# Patient Record
Sex: MALE | Race: WHITE | NOT HISPANIC OR LATINO | Employment: FULL TIME | URBAN - METROPOLITAN AREA
[De-identification: names, ages, dates, MRNs, and addresses within clinical notes are randomized per-mention and may not be internally consistent; named-entity substitution may affect disease eponyms.]

---

## 2019-05-02 ENCOUNTER — OFFICE VISIT (OUTPATIENT)
Dept: FAMILY MEDICINE CLINIC | Facility: CLINIC | Age: 24
End: 2019-05-02

## 2019-05-02 VITALS
TEMPERATURE: 98.4 F | OXYGEN SATURATION: 99 % | DIASTOLIC BLOOD PRESSURE: 60 MMHG | RESPIRATION RATE: 16 BRPM | HEART RATE: 84 BPM | SYSTOLIC BLOOD PRESSURE: 100 MMHG | HEIGHT: 74 IN | BODY MASS INDEX: 22.33 KG/M2 | WEIGHT: 174 LBS

## 2019-05-02 DIAGNOSIS — L72.3 INFECTED SEBACEOUS CYST OF SKIN: Primary | ICD-10-CM

## 2019-05-02 DIAGNOSIS — L08.9 INFECTED SEBACEOUS CYST OF SKIN: Primary | ICD-10-CM

## 2019-05-02 PROCEDURE — 99213 OFFICE O/P EST LOW 20 MIN: CPT | Performed by: NURSE PRACTITIONER

## 2019-05-02 RX ORDER — CEPHALEXIN 500 MG/1
500 CAPSULE ORAL 3 TIMES DAILY
Qty: 30 CAPSULE | Refills: 0 | Status: SHIPPED | OUTPATIENT
Start: 2019-05-02 | End: 2019-10-10

## 2019-05-02 NOTE — PROGRESS NOTES
Subjective   Adolfo Vásquez is a 24 y.o. male.     History of Present Illness   Patient presents to office for painful cyst to right axilla.  Reports cyst has been present for about a year but was about the size of a pea. He states it recently started to increase in size and become painful. Denies fever, chills or drainage.    The following portions of the patient's history were reviewed and updated as appropriate: allergies, current medications, past family history, past medical history, past social history, past surgical history and problem list.    Review of Systems   Constitutional: Negative for chills and fever.   Musculoskeletal: Negative for joint swelling.   Skin:        cyst       Objective   Physical Exam   Constitutional: He is oriented to person, place, and time. He appears well-developed and well-nourished.   Pulmonary/Chest: Effort normal.   Neurological: He is alert and oriented to person, place, and time.   Skin: Skin is warm and dry.   1.5 cm firm, non fluctuant, tender cyst to right axilla    Psychiatric: He has a normal mood and affect. His behavior is normal. Judgment and thought content normal.   Nursing note and vitals reviewed.      Assessment/Plan   Adolfo was seen today for mass and painful to touch.    Diagnoses and all orders for this visit:    Infected sebaceous cyst of skin  -     cephalexin (KEFLEX) 500 MG capsule; Take 1 capsule by mouth 3 (Three) Times a Day.          Reports reaction to amoxicillin was red discoloration to the skin. Denies anaphylaxis, swelling, hives or itching.

## 2019-05-09 ENCOUNTER — OFFICE VISIT (OUTPATIENT)
Dept: FAMILY MEDICINE CLINIC | Facility: CLINIC | Age: 24
End: 2019-05-09

## 2019-05-09 VITALS
HEIGHT: 74 IN | WEIGHT: 174 LBS | HEART RATE: 71 BPM | OXYGEN SATURATION: 99 % | BODY MASS INDEX: 22.33 KG/M2 | DIASTOLIC BLOOD PRESSURE: 73 MMHG | RESPIRATION RATE: 16 BRPM | SYSTOLIC BLOOD PRESSURE: 120 MMHG

## 2019-05-09 DIAGNOSIS — L72.3 INFECTED SEBACEOUS CYST OF SKIN: Primary | ICD-10-CM

## 2019-05-09 DIAGNOSIS — L08.9 INFECTED SEBACEOUS CYST OF SKIN: Primary | ICD-10-CM

## 2019-05-09 PROCEDURE — 99212 OFFICE O/P EST SF 10 MIN: CPT | Performed by: NURSE PRACTITIONER

## 2019-05-09 NOTE — PROGRESS NOTES
Subjective   Adolfo Vásquez is a 24 y.o. male.     History of Present Illness   Patient presents to office for f/u on right axillary cyst. At last visit, he was started on cephalexin. States he has been taking as prescribed and has 3 days left.  He reports cyst has decreased in size, firmness and tenderness.  Has not drained.     The following portions of the patient's history were reviewed and updated as appropriate: allergies, current medications, past family history, past medical history, past social history, past surgical history and problem list.    Review of Systems   Constitutional: Negative for chills and fever.   Skin:        Cyst         Objective   Physical Exam   Constitutional: He is oriented to person, place, and time. He appears well-developed and well-nourished.   Pulmonary/Chest: Effort normal.   Neurological: He is oriented to person, place, and time.   Skin: Skin is warm and dry.        1 cm mildly fluctuant cyst to right axilla    Psychiatric: He has a normal mood and affect. His behavior is normal. Judgment and thought content normal.   Nursing note and vitals reviewed.      Assessment/Plan   Adolfo was seen today for follow-up and cyst.    Diagnoses and all orders for this visit:    Infected sebaceous cyst of skin             Patient to contact office on Monday to notify of improvement.  May need to extend antibiotics for a few more days.

## 2019-10-10 ENCOUNTER — OFFICE VISIT (OUTPATIENT)
Dept: FAMILY MEDICINE CLINIC | Facility: CLINIC | Age: 24
End: 2019-10-10

## 2019-10-10 VITALS
BODY MASS INDEX: 23.61 KG/M2 | DIASTOLIC BLOOD PRESSURE: 81 MMHG | RESPIRATION RATE: 16 BRPM | HEART RATE: 73 BPM | WEIGHT: 184 LBS | SYSTOLIC BLOOD PRESSURE: 138 MMHG | HEIGHT: 74 IN | OXYGEN SATURATION: 100 %

## 2019-10-10 DIAGNOSIS — Z87.19 HISTORY OF RECTAL BLEEDING: ICD-10-CM

## 2019-10-10 DIAGNOSIS — S29.019A THORACIC MYOFASCIAL STRAIN, INITIAL ENCOUNTER: Primary | ICD-10-CM

## 2019-10-10 PROBLEM — J30.2 CHRONIC SEASONAL ALLERGIC RHINITIS: Status: ACTIVE | Noted: 2019-10-10

## 2019-10-10 PROCEDURE — 99214 OFFICE O/P EST MOD 30 MIN: CPT | Performed by: FAMILY MEDICINE

## 2019-10-10 RX ORDER — PSYLLIUM SEED (WITH DEXTROSE)
2 POWDER (GRAM) ORAL DAILY
Qty: 60 WAFER | Refills: 0
Start: 2019-10-10 | End: 2021-07-16

## 2019-10-10 RX ORDER — IBUPROFEN 200 MG
200 TABLET ORAL EVERY 6 HOURS PRN
COMMUNITY
End: 2022-05-10

## 2019-10-10 NOTE — PROGRESS NOTES
"Chief Complaint:   Subjective    Rooming Tab(CC,VS,Pt Hx,Fall Screen)   Chief Complaint   Patient presents with   • Back Pain   • Rectal Bleeding     1 mo    • Hemorrhoids         History of Present Illness   Adolfo Vásquez is a 24 y.o. male who presents to the office today to evaluate 1 month history of middle back pain.  Patient has been lifting weights over the past 1 to 2 months.  He is not had an acute injury but has noticed the pain.  Pain seems to be helped with some stretching.  He did get a little relief when he took an ibuprofen.  Pain does not radiate.  Pain intensity is moderate.  Pain does interfere with sleep.  Denies urinary issues.  Patient has also recently over the past month or so had some painful bowel movements and some bright red blood noticed.  His bowel habits are about every 3 days.  He has noticed his bowel movements to have a hard consistency.    I have reviewed and updated his medications, medical history and problem list during today's office visit.     Problem List Tab  Medications Tab  Synopsis Tab  Chart Review Tab  Care Everywhere Tab  Immunizations Tab  Patient History Tab  Social History     Tobacco Use   • Smoking status: Current Every Day Smoker   • Smokeless tobacco: Never Used   Substance Use Topics   • Alcohol use: Yes     Comment: \"heavily\"       Review of Systems   Constitutional: Negative for chills, diaphoresis and fever.   Gastrointestinal:        See HPI   Genitourinary: Negative for dysuria and frequency.   Musculoskeletal: Positive for back pain.         Physical Examination:   Objective   Rooming Tab(CC,VS,Pt Hx,Fall Screen)  /81   Pulse 73   Resp 16   Ht 188 cm (74.02\")   Wt 83.5 kg (184 lb)   SpO2 100%   BMI 23.61 kg/m²     Body mass index is 23.61 kg/m².    Physical Exam   Constitutional: He appears well-developed. No distress.   Genitourinary:   Genitourinary Comments: No fissure seen, no external hemmorhoid   Musculoskeletal:        Thoracic back: " He exhibits tenderness. He exhibits normal range of motion.        Back:         Data Reviewed:                      Assessment/Plan:   Assessment/Plan   Order Review Tab  Health Maintenance Tab  Patient Plan/Order Tab  Diagnoses and all orders for this visit:    1. Thoracic myofascial strain, initial encounter (Primary)  Assessment & Plan:  New problem most likely due to recent weight lifting.  Will engage physical therapy to evaluate and treat.  Use ibuprofen as needed.    Orders:  -     Ambulatory Referral to Physical Therapy Evaluate and treat    2. History of rectal bleeding  Assessment & Plan:  New problem over the last 1/2 months.  If current regimen does not work then at our next visit we may consider referral to colorectal surgeon.    Orders:  -     hydrocortisone-pramoxine (PROCTOFOAM HC) 1-1 % rectal foam; Insert 1 applicator into the rectum 2 (Two) Times a Day for 7 days.  Dispense: 10 g; Refill: 0  -     Psyllium (METAMUCIL) wafer wafer; Take 2 wafers by mouth Daily for 30 days.  Dispense: 60 wafer; Refill: 0     Follow up:   Wrapup Tab  Return in about 1 month (around 11/10/2019) for Recheck.

## 2019-10-10 NOTE — ASSESSMENT & PLAN NOTE
New problem most likely due to recent weight lifting.  Will engage physical therapy to evaluate and treat.  Use ibuprofen as needed.

## 2019-10-10 NOTE — PATIENT INSTRUCTIONS
Increase fiber in the diet including fruits vegetables and whole grain cereals and breads.  Make sure you drink 6 to 8 glasses of water daily.  Take the Metamucil as directed.

## 2019-10-10 NOTE — ASSESSMENT & PLAN NOTE
New problem over the last 1/2 months.  If current regimen does not work then at our next visit we may consider referral to colorectal surgeon.

## 2020-01-30 ENCOUNTER — OFFICE VISIT (OUTPATIENT)
Dept: FAMILY MEDICINE CLINIC | Facility: CLINIC | Age: 25
End: 2020-01-30

## 2020-01-30 VITALS
HEIGHT: 74 IN | RESPIRATION RATE: 16 BRPM | HEART RATE: 51 BPM | WEIGHT: 193 LBS | SYSTOLIC BLOOD PRESSURE: 127 MMHG | DIASTOLIC BLOOD PRESSURE: 85 MMHG | BODY MASS INDEX: 24.77 KG/M2 | OXYGEN SATURATION: 98 %

## 2020-01-30 DIAGNOSIS — L03.213 PERIORBITAL CELLULITIS OF RIGHT EYE: Primary | ICD-10-CM

## 2020-01-30 PROCEDURE — 99214 OFFICE O/P EST MOD 30 MIN: CPT | Performed by: FAMILY MEDICINE

## 2020-01-30 RX ORDER — CLINDAMYCIN HYDROCHLORIDE 300 MG/1
300 CAPSULE ORAL 4 TIMES DAILY
Qty: 40 CAPSULE | Refills: 0 | Status: SHIPPED | OUTPATIENT
Start: 2020-01-30 | End: 2020-02-09

## 2020-01-30 NOTE — PROGRESS NOTES
"   Subjective       Chief Complaint   Patient presents with   • Eye Pain     right eye swollen/red         HPI:       Adolfo Vásquez is a 24 y.o. male who presents to the office today with infection in right eye.  Symptoms started Sunday and got worse on Monday.  No fever.  No sweats or chills.  He thinks it might be a foreign body but has not done anything other than get symptoms after he was laying on a couch.  The eye is painful when he moves his globe of the eye.  No issues of tenderness on the eyelid although it shows swelling and redness in that area.  Patient works as a  at a local [employer] and will be placed off work until released.    I have reviewed and updated his medications, medical history and problem list during today's office visit.     Social History     Tobacco Use   • Smoking status: Current Every Day Smoker   • Smokeless tobacco: Never Used   Substance Use Topics   • Alcohol use: Yes     Comment: \"heavily\"       Review of Systems   Constitutional: Negative for chills and fever.   Eyes:        See HPI         PE:   Objective   /85   Pulse 51   Resp 16   Ht 188 cm (74.02\")   Wt 87.5 kg (193 lb)   SpO2 98%   BMI 24.77 kg/m²     Body mass index is 24.77 kg/m².    Physical Exam   Constitutional: He appears well-developed. No distress.   Eyes:   Conjunctivitis noted right eye.  No foreign body seen.  Pupils equal round reactive.  Upper lower lids swollen red.  Nontender to palpation.        Data Reviewed:                      A/P:     Assessment/Plan   Diagnoses and all orders for this visit:    1. Periorbital cellulitis of right eye (Primary)  Assessment & Plan:  New problem.  Prescription sent to pharmacy.  Urgent referral to ophthalmology for evaluation.    Orders:  -     clindamycin (CLEOCIN) 300 MG capsule; Take 1 capsule by mouth 4 (Four) Times a Day for 10 days.  Dispense: 40 capsule; Refill: 0  -     Ambulatory Referral to Ophthalmology    Patient Instructions         "   Follow up:    Return if symptoms worsen or fail to improve.

## 2021-04-12 ENCOUNTER — OFFICE VISIT (OUTPATIENT)
Dept: FAMILY MEDICINE CLINIC | Facility: CLINIC | Age: 26
End: 2021-04-12

## 2021-04-12 VITALS
SYSTOLIC BLOOD PRESSURE: 130 MMHG | OXYGEN SATURATION: 96 % | TEMPERATURE: 97.1 F | RESPIRATION RATE: 16 BRPM | DIASTOLIC BLOOD PRESSURE: 82 MMHG | HEART RATE: 98 BPM | WEIGHT: 188 LBS | HEIGHT: 74 IN | BODY MASS INDEX: 24.13 KG/M2

## 2021-04-12 DIAGNOSIS — Z87.19 HISTORY OF RECTAL BLEEDING: Primary | ICD-10-CM

## 2021-04-12 PROBLEM — L03.213 PERIORBITAL CELLULITIS OF RIGHT EYE: Status: RESOLVED | Noted: 2020-01-30 | Resolved: 2021-04-12

## 2021-04-12 PROBLEM — S29.019A THORACIC MYOFASCIAL STRAIN: Status: RESOLVED | Noted: 2019-10-10 | Resolved: 2021-04-12

## 2021-04-12 PROCEDURE — 99213 OFFICE O/P EST LOW 20 MIN: CPT | Performed by: FAMILY MEDICINE

## 2021-04-12 NOTE — PROGRESS NOTES
"Chief Complaint  GI Problem    Subjective      History of Present Illness      Adolfo Váqsuez presents to [facility] to reevaluate history of rectal bleeding.  His symptoms continue to happen regularly.  He has bright red bleeding around the stool and not mixed with stool with most bowel movements.  Some pain with defecation.  No interval weight loss.  No other symptoms.    Review of Systems          Objective     Vital Signs:   /82   Pulse 98   Temp 97.1 °F (36.2 °C) (Tympanic)   Resp 16   Ht 188 cm (74\")   Wt 85.3 kg (188 lb)   SpO2 96%   BMI 24.14 kg/m²       Physical Exam  Constitutional:       Appearance: He is not ill-appearing.   Genitourinary:     Comments: No active bleeding.  Partially healed fissure noted  Neurological:      Mental Status: He is alert.              Result Review :                            Assessment/Plan     Assessment and Plan      Diagnoses and all orders for this visit:    1. History of rectal bleeding (Primary)  Assessment & Plan:  Ongoing problem.  Referral to colorectal surgeon for definitive diagnosis and treatment.  Will probably need colonoscopy.    Orders:  -     Ambulatory Referral to Colorectal Surgery          Follow Up   Return if symptoms worsen or fail to improve.    Patient was given instructions and counseling regarding his condition or for health maintenance advice. Please see specific information pulled into the AVS if appropriate.         "

## 2021-04-12 NOTE — ASSESSMENT & PLAN NOTE
Ongoing problem.  Referral to colorectal surgeon for definitive diagnosis and treatment.  Will probably need colonoscopy.

## 2021-07-16 ENCOUNTER — OFFICE VISIT (OUTPATIENT)
Dept: SURGERY | Facility: CLINIC | Age: 26
End: 2021-07-16

## 2021-07-16 VITALS
HEIGHT: 74 IN | TEMPERATURE: 97.1 F | HEART RATE: 71 BPM | BODY MASS INDEX: 24.45 KG/M2 | DIASTOLIC BLOOD PRESSURE: 76 MMHG | WEIGHT: 190.5 LBS | SYSTOLIC BLOOD PRESSURE: 120 MMHG | OXYGEN SATURATION: 97 %

## 2021-07-16 DIAGNOSIS — K60.2 ANAL FISSURE: Primary | ICD-10-CM

## 2021-07-16 PROCEDURE — 99243 OFF/OP CNSLTJ NEW/EST LOW 30: CPT | Performed by: COLON & RECTAL SURGERY

## 2021-07-16 NOTE — PROGRESS NOTES
Adolfo Vásquez is a 26 y.o. male who is seen as a consult at the request of Jt Jensen MD for Anal Fissure.      HPI:  Rectal Bleeding and pain with BM  RB occurs with almost every BM  BM every 2-3 days  Taking Metamucil with some improvement in symptoms  Tried OTC SS without significant improvement   Hx of Anal Fissure    No FHx of colon polyps, colon cancer, or IBD.     Past Medical History:   Diagnosis Date   • Chlamydia 03/13/2019   • Chronic seasonal allergic rhinitis 10/10/2019   • Dysuria 02/2019   • History of rectal bleeding 10/10/2019   • Infected sebaceous cyst 05/2019    RIGHT AXILLA   • Laceration of left index finger 01/02/2019    SEEN AT [facility]   • Periorbital cellulitis of right eye 1/30/2020   • Strep pharyngitis 03/2020   • Thoracic myofascial strain 10/2019       Past Surgical History:   Procedure Laterality Date   • WISDOM TOOTH EXTRACTION Bilateral        Social History:   reports that he has quit smoking. His smoking use included cigarettes. He has never used smokeless tobacco. He reports current alcohol use. He reports that he does not use drugs.      Marriage status: Single    History reviewed. No pertinent family history.      Current Outpatient Medications:   •  ibuprofen (ADVIL,MOTRIN) 200 MG tablet, Take 200 mg by mouth Every 6 (Six) Hours As Needed., Disp: , Rfl:     Allergy  Amoxicillin    Review of Systems   Constitutional: Negative for decreased appetite and weight gain.   HENT: Negative for congestion, hearing loss and hoarse voice.    Eyes: Negative for blurred vision, discharge and visual disturbance.   Cardiovascular: Negative for chest pain, cyanosis and leg swelling.   Respiratory: Negative for cough, shortness of breath, sleep disturbances due to breathing and snoring.    Endocrine: Negative for cold intolerance and heat intolerance.   Hematologic/Lymphatic: Does not bruise/bleed easily.   Skin: Positive for itching. Negative for poor wound healing and skin cancer.    Musculoskeletal: Positive for back pain. Negative for arthritis, joint pain and joint swelling.   Gastrointestinal: Positive for change in bowel habit. Negative for abdominal pain, bowel incontinence and constipation.   Genitourinary: Negative for bladder incontinence, dysuria and hematuria.   Neurological: Positive for excessive daytime sleepiness. Negative for brief paralysis, dizziness, focal weakness, headaches, light-headedness and weakness.   Psychiatric/Behavioral: Negative for altered mental status and hallucinations. The patient does not have insomnia.    Allergic/Immunologic: Negative for HIV exposure and persistent infections.   All other systems reviewed and are negative.      Vitals:    07/16/21 1427   BP: 120/76   Pulse: 71   Temp: 97.1 °F (36.2 °C)   SpO2: 97%     Body mass index is 24.46 kg/m².    Physical Exam  Exam conducted with a chaperone present.   Constitutional:       General: He is not in acute distress.     Appearance: He is well-developed.   HENT:      Head: Normocephalic and atraumatic.      Nose: Nose normal.   Eyes:      Conjunctiva/sclera: Conjunctivae normal.      Pupils: Pupils are equal, round, and reactive to light.   Neck:      Trachea: No tracheal deviation.   Pulmonary:      Effort: Pulmonary effort is normal. No respiratory distress.      Breath sounds: Normal breath sounds.   Abdominal:      General: Bowel sounds are normal. There is no distension.      Palpations: Abdomen is soft.   Genitourinary:     Comments: Perianal exam: Anterior anal fissure  Musculoskeletal:         General: No deformity. Normal range of motion.      Cervical back: Normal range of motion.   Skin:     General: Skin is warm and dry.   Neurological:      Mental Status: He is alert and oriented to person, place, and time.      Cranial Nerves: No cranial nerve deficit.      Coordination: Coordination normal.      Gait: Gait normal.   Psychiatric:         Behavior: Behavior normal.         Judgment:  Judgment normal.         Assessment:  1. Anal fissure      Plan:  - Rx for Diltiazem/Lidocaine compound cream provided. Pt instructed to apply the cream externally TID to relax the sphincter muscle  - Recommended 30-40g of Fiber daily, sitz baths, and SS  - Will consider fistulotomy if symptoms do not improve. Discussed risks, benefits, and alternatives.   - Follow up in 6-8 weeks for reevaluation       Scribed for José Manuel Neves MD by Indy Guillen PA-C. 7/16/2021  14:46 EDT   This patient was evaluated by me, recommendations made, documentation reviewed, edited, and revised by me, José Manuel Neves MD

## 2022-05-10 ENCOUNTER — OFFICE VISIT (OUTPATIENT)
Dept: FAMILY MEDICINE CLINIC | Facility: CLINIC | Age: 27
End: 2022-05-10

## 2022-05-10 VITALS
SYSTOLIC BLOOD PRESSURE: 122 MMHG | DIASTOLIC BLOOD PRESSURE: 70 MMHG | TEMPERATURE: 97.5 F | HEIGHT: 74 IN | WEIGHT: 190 LBS | BODY MASS INDEX: 24.38 KG/M2 | RESPIRATION RATE: 18 BRPM | HEART RATE: 70 BPM | OXYGEN SATURATION: 99 %

## 2022-05-10 DIAGNOSIS — R20.0 NUMBNESS AND TINGLING: Primary | ICD-10-CM

## 2022-05-10 DIAGNOSIS — R20.2 NUMBNESS AND TINGLING: Primary | ICD-10-CM

## 2022-05-10 DIAGNOSIS — R17 ELEVATED BILIRUBIN: ICD-10-CM

## 2022-05-10 DIAGNOSIS — L23.9 ALLERGIC CONTACT DERMATITIS, UNSPECIFIED TRIGGER: ICD-10-CM

## 2022-05-10 DIAGNOSIS — E55.9 VITAMIN D DEFICIENCY: ICD-10-CM

## 2022-05-10 PROCEDURE — 99214 OFFICE O/P EST MOD 30 MIN: CPT | Performed by: NURSE PRACTITIONER

## 2022-05-10 RX ORDER — NYSTATIN AND TRIAMCINOLONE ACETONIDE 100000; 1 [USP'U]/G; MG/G
1 OINTMENT TOPICAL 2 TIMES DAILY
Qty: 60 G | Refills: 1 | Status: SHIPPED | OUTPATIENT
Start: 2022-05-10 | End: 2022-08-15

## 2022-05-10 NOTE — PROGRESS NOTES
"Chief Complaint  numbness and tingling left foot and calf (3 days left foot and ankle )    Subjective          Adolfo presents to [facility]    27 year old male presented to the clinic today c/o some numbness and tingling in his left foot and ankle for the past 3 days.  He has not had an known injury that he is aware of, but does have some pain in the \"ball of his foot\"  He has a tender area, that is not red or swollen, there does look to be a puncture juan in that area, but he denies stepping on anything that he is aware of.  He did have some glass that was broken a few months ago that he stepped on in that area, but has not had any problems since that time.  He does have a history of low back pain, but no change or flare of that recently.  He has not had any recent lab work, but does not have a history of any anemia/ glucose problems in the past.  Numbness come with position changes,  Worse when he crosses his leg for extended time or sis/ sleeps in the same position for a long period of time.  No swelling in foot/ ankle/ calf  He does admit to lack of exercise, poor eating habits and occasional excessive alcohol intake    He also has a rash in his axillary area.  States it has been there intermittent for several months.  Improves any returns.  Unsure if it is related to deodorant he uses.  He has no changed that recently.  NO other new soaps or lotions.  States he does occassionally have rash on his torso, but none at this time.  Relieves on its own without any intervention.  No past allergy   testing.    B/p in office today 122/70. He denies any ha, no blurred vision, no dizziness, no acute chest pain or pressure.     He has no other c/o today      No recent labs-  He is fasting today except some peppers this am    The following portions of the patient's history were reviewed and updated as appropriate: allergies, current medications, past family history, past medical history, " "past social history, past surgical history, and problem list      Review of Systems   Constitutional: Negative for chills, fatigue and fever.   Eyes: Negative for visual disturbance.   Respiratory: Negative for cough and shortness of breath.    Cardiovascular: Negative for chest pain, palpitations and leg swelling.   Gastrointestinal: Negative for abdominal pain, diarrhea, nausea and vomiting.   Musculoskeletal: Positive for back pain.   Skin: Positive for rash.   Neurological: Positive for numbness. Negative for dizziness and light-headedness.        Objective   Vital Signs:   Vitals:    05/10/22 1436   BP: 122/70   Pulse: 70   Resp: 18   Temp: 97.5 °F (36.4 °C)   SpO2: 99%   Weight: 86.2 kg (190 lb)   Height: 188 cm (74.02\")        BMI is within normal parameters. No follow-up required.       Physical Exam  Vitals reviewed.   Constitutional:       Appearance: Normal appearance. He is not ill-appearing.   HENT:      Head: Normocephalic.      Nose: Nose normal.      Mouth/Throat:      Mouth: Mucous membranes are moist.      Pharynx: Oropharynx is clear.   Eyes:      Conjunctiva/sclera: Conjunctivae normal.   Neck:      Vascular: No carotid bruit.   Cardiovascular:      Rate and Rhythm: Normal rate and regular rhythm.      Pulses: Normal pulses.      Heart sounds: Normal heart sounds. No murmur heard.  Pulmonary:      Effort: Pulmonary effort is normal.      Breath sounds: Normal breath sounds.   Musculoskeletal:        Feet:    Feet:      Comments: Small puncture juan to left sole under heads of the metatarsal bones  Numbness and tingling intermittent to left foot and toes  No swelling, erythema/ drainage  Skin:     General: Skin is warm.      Findings: Rash present. Rash is macular and papular.             Comments: erythematous mac/pap rash to axillary area puritic   Neurological:      Mental Status: He is alert and oriented to person, place, and time.   Psychiatric:         Mood and Affect: Mood normal.         " Behavior: Behavior normal.         Thought Content: Thought content normal.         Judgment: Judgment normal.          Result Review :     The following data was reviewed by: ISRAEL Naik on 05/10/2022:           Assessment and Plan    Diagnoses and all orders for this visit:    1. Numbness and tingling (Primary)  Comments:  avoid alcohol  labs  return immediatly with any change  Orders:  -     Comprehensive Metabolic Panel  -     CBC & Differential  -     Hemoglobin A1c  -     TSH  -     T4, Free  -     Vitamin B12  -     Folate  -     Vitamin D 25 Hydroxy  -     Lipid Panel  -     Ambulatory Referral to Podiatry    2. Allergic contact dermatitis, unspecified trigger  -     nystatin-triamcinolone (MYCOLOG) 222774-4.1 UNIT/GM-% ointment; Apply 1 application topically to the appropriate area as directed 2 (Two) Times a Day.  Dispense: 60 g; Refill: 1      Follow Up   Return in about 1 month (around 6/10/2022), or if symptoms worsen or fail to improve, for Annual physical.  Patient was given instructions and counseling regarding his condition or for health maintenance advice. Please see specific information pulled into the AVS if appropriate.

## 2022-05-11 LAB
25(OH)D3+25(OH)D2 SERPL-MCNC: 12.5 NG/ML (ref 30–100)
ALBUMIN SERPL-MCNC: 4.9 G/DL (ref 4.1–5.2)
ALBUMIN/GLOB SERPL: 2.1 {RATIO} (ref 1.2–2.2)
ALP SERPL-CCNC: 65 IU/L (ref 44–121)
ALT SERPL-CCNC: 17 IU/L (ref 0–44)
AST SERPL-CCNC: 19 IU/L (ref 0–40)
BASOPHILS # BLD AUTO: 0.1 X10E3/UL (ref 0–0.2)
BASOPHILS NFR BLD AUTO: 1 %
BILIRUB SERPL-MCNC: 2.1 MG/DL (ref 0–1.2)
BUN SERPL-MCNC: 13 MG/DL (ref 6–20)
BUN/CREAT SERPL: 13 (ref 9–20)
CALCIUM SERPL-MCNC: 10.1 MG/DL (ref 8.7–10.2)
CHLORIDE SERPL-SCNC: 99 MMOL/L (ref 96–106)
CHOLEST SERPL-MCNC: 164 MG/DL (ref 100–199)
CO2 SERPL-SCNC: 23 MMOL/L (ref 20–29)
CREAT SERPL-MCNC: 1.02 MG/DL (ref 0.76–1.27)
EGFRCR SERPLBLD CKD-EPI 2021: 104 ML/MIN/1.73
EOSINOPHIL # BLD AUTO: 0.1 X10E3/UL (ref 0–0.4)
EOSINOPHIL NFR BLD AUTO: 1 %
ERYTHROCYTE [DISTWIDTH] IN BLOOD BY AUTOMATED COUNT: 12 % (ref 11.6–15.4)
FOLATE SERPL-MCNC: 10.5 NG/ML
GLOBULIN SER CALC-MCNC: 2.3 G/DL (ref 1.5–4.5)
GLUCOSE SERPL-MCNC: 98 MG/DL (ref 65–99)
HBA1C MFR BLD: 4.8 % (ref 4.8–5.6)
HCT VFR BLD AUTO: 46.4 % (ref 37.5–51)
HDLC SERPL-MCNC: 59 MG/DL
HGB BLD-MCNC: 15.7 G/DL (ref 13–17.7)
IMM GRANULOCYTES # BLD AUTO: 0 X10E3/UL (ref 0–0.1)
IMM GRANULOCYTES NFR BLD AUTO: 0 %
LDLC SERPL CALC-MCNC: 87 MG/DL (ref 0–99)
LYMPHOCYTES # BLD AUTO: 1.3 X10E3/UL (ref 0.7–3.1)
LYMPHOCYTES NFR BLD AUTO: 21 %
MCH RBC QN AUTO: 30.4 PG (ref 26.6–33)
MCHC RBC AUTO-ENTMCNC: 33.8 G/DL (ref 31.5–35.7)
MCV RBC AUTO: 90 FL (ref 79–97)
MONOCYTES # BLD AUTO: 0.4 X10E3/UL (ref 0.1–0.9)
MONOCYTES NFR BLD AUTO: 7 %
NEUTROPHILS # BLD AUTO: 4.2 X10E3/UL (ref 1.4–7)
NEUTROPHILS NFR BLD AUTO: 70 %
PLATELET # BLD AUTO: 217 X10E3/UL (ref 150–450)
POTASSIUM SERPL-SCNC: 4.4 MMOL/L (ref 3.5–5.2)
PROT SERPL-MCNC: 7.2 G/DL (ref 6–8.5)
RBC # BLD AUTO: 5.17 X10E6/UL (ref 4.14–5.8)
SODIUM SERPL-SCNC: 138 MMOL/L (ref 134–144)
T4 FREE SERPL-MCNC: 1.15 NG/DL (ref 0.82–1.77)
TRIGL SERPL-MCNC: 96 MG/DL (ref 0–149)
TSH SERPL DL<=0.005 MIU/L-ACNC: 0.68 UIU/ML (ref 0.45–4.5)
VIT B12 SERPL-MCNC: 504 PG/ML (ref 232–1245)
VLDLC SERPL CALC-MCNC: 18 MG/DL (ref 5–40)
WBC # BLD AUTO: 6 X10E3/UL (ref 3.4–10.8)

## 2022-08-15 ENCOUNTER — OFFICE VISIT (OUTPATIENT)
Dept: FAMILY MEDICINE CLINIC | Facility: CLINIC | Age: 27
End: 2022-08-15

## 2022-08-15 VITALS
WEIGHT: 192 LBS | BODY MASS INDEX: 24.64 KG/M2 | SYSTOLIC BLOOD PRESSURE: 102 MMHG | HEIGHT: 74 IN | DIASTOLIC BLOOD PRESSURE: 66 MMHG | TEMPERATURE: 97.4 F | RESPIRATION RATE: 16 BRPM | OXYGEN SATURATION: 99 % | HEART RATE: 64 BPM

## 2022-08-15 DIAGNOSIS — R10.9 ACUTE RIGHT FLANK PAIN: ICD-10-CM

## 2022-08-15 DIAGNOSIS — Z78.9 ALCOHOL CONSUMPTION HEAVY: ICD-10-CM

## 2022-08-15 DIAGNOSIS — R17 ELEVATED BILIRUBIN: ICD-10-CM

## 2022-08-15 DIAGNOSIS — E55.9 VITAMIN D DEFICIENCY: Primary | ICD-10-CM

## 2022-08-15 PROCEDURE — 99214 OFFICE O/P EST MOD 30 MIN: CPT | Performed by: FAMILY MEDICINE

## 2022-08-15 NOTE — PROGRESS NOTES
"Chief Complaint  Follow-up (3 month f/u) and Flank Pain (Rt flank pain, x 3wks )    Subjective          Adolfo presents to to the office to discuss recent issues.  He has been taking his vitamin D supplement and his foot tingling numbness discomfort has improved.  It only occurs when he crosses his leg over the other 1 for time.  Repeat labs are due.  Initial labs did show elevated bilirubin Mayte does ask if it could be related to alcohol use.  He started drinking at age 16 but over the past 3 years it has become heavy.  Recently he has cut back to weekends only but still consumes up to 18-24 standard drinks per week.  He does not have any history of getting into trouble with either the wall or with his work due to his alcohol use.    Over the past 3 weeks he has had some right flank pain and he wonders if this could be related to his bilirubin.  No skin color change or jaundice or nausea or vomiting or fever sweats or chills reported.     Objective   Vital Signs:   Vitals:    08/15/22 1337   BP: 102/66   Pulse: 64   Resp: 16   Temp: 97.4 °F (36.3 °C)   SpO2: 99%   Weight: 87.1 kg (192 lb)   Height: 188 cm (74.02\")        BMI is within normal parameters. No other follow-up for BMI required.        Physical Exam  Constitutional:       General: He is not in acute distress.     Appearance: He is not ill-appearing.   Abdominal:      General: Abdomen is flat.      Palpations: Abdomen is soft.      Tenderness: There is no abdominal tenderness. There is no guarding or rebound.      Comments: Mild right flank tenderness   Skin:     General: Skin is warm and dry.      Coloration: Skin is not jaundiced.      Findings: No rash.   Neurological:      Mental Status: He is alert.          Result Review :                Assessment and Plan    Diagnoses and all orders for this visit:    1. Vitamin D deficiency (Primary)  Assessment & Plan:  The current medical regimen is effective;  continue present plan and medications.        2. " Elevated bilirubin  Assessment & Plan:  Recheck labs    Orders:  -     US Liver; Future    3. Alcohol consumption heavy  Assessment & Plan:  Advised no more than 14 standard drinks daily. Information shared in AVS.       4. Acute right flank pain  Assessment & Plan:  Check liver ultrasound, get ordered labs. Decrease ETOH (preferably no more than one standard drink daily).    Orders:  -     US Liver; Future      Follow Up   No follow-ups on file.  Patient was given instructions and counseling regarding his condition or for health maintenance advice. Please see specific information pulled into the AVS if appropriate.

## 2022-08-15 NOTE — ASSESSMENT & PLAN NOTE
Check liver ultrasound, get ordered labs. Decrease ETOH (preferably no more than one standard drink daily).

## 2022-08-22 ENCOUNTER — HOSPITAL ENCOUNTER (OUTPATIENT)
Dept: ULTRASOUND IMAGING | Facility: HOSPITAL | Age: 27
Discharge: HOME | End: 2022-08-22
Admitting: FAMILY MEDICINE

## 2022-08-22 DIAGNOSIS — R10.9 ACUTE RIGHT FLANK PAIN: ICD-10-CM

## 2022-08-22 DIAGNOSIS — R17 ELEVATED BILIRUBIN: ICD-10-CM

## 2022-08-22 PROCEDURE — 76705 ECHO EXAM OF ABDOMEN: CPT

## 2022-09-05 PROBLEM — K76.0 HEPATIC STEATOSIS: Status: ACTIVE | Noted: 2022-09-05

## 2025-07-24 ENCOUNTER — APPOINTMENT (OUTPATIENT)
Dept: ULTRASOUND IMAGING | Facility: HOSPITAL | Age: 30
End: 2025-07-24
Payer: COMMERCIAL

## 2025-07-24 ENCOUNTER — HOSPITAL ENCOUNTER (EMERGENCY)
Facility: HOSPITAL | Age: 30
Discharge: HOME | End: 2025-07-24
Attending: EMERGENCY MEDICINE | Admitting: EMERGENCY MEDICINE
Payer: COMMERCIAL

## 2025-07-24 VITALS
WEIGHT: 205 LBS | HEIGHT: 74 IN | RESPIRATION RATE: 15 BRPM | BODY MASS INDEX: 26.31 KG/M2 | OXYGEN SATURATION: 100 % | TEMPERATURE: 97.9 F | DIASTOLIC BLOOD PRESSURE: 86 MMHG | SYSTOLIC BLOOD PRESSURE: 147 MMHG | HEART RATE: 86 BPM

## 2025-07-24 DIAGNOSIS — N50.811 PAIN IN BOTH TESTICLES: Primary | ICD-10-CM

## 2025-07-24 DIAGNOSIS — N50.812 PAIN IN BOTH TESTICLES: Primary | ICD-10-CM

## 2025-07-24 LAB
ALBUMIN SERPL-MCNC: 4.8 G/DL (ref 3.5–5.2)
ALBUMIN/GLOB SERPL: 1.8 G/DL
ALP SERPL-CCNC: 72 U/L (ref 39–117)
ALT SERPL W P-5'-P-CCNC: 20 U/L (ref 1–41)
ANION GAP SERPL CALCULATED.3IONS-SCNC: 13 MMOL/L (ref 5–15)
AST SERPL-CCNC: 18 U/L (ref 1–40)
BASOPHILS # BLD AUTO: 0.04 10*3/MM3 (ref 0–0.2)
BASOPHILS NFR BLD AUTO: 0.5 % (ref 0–1.5)
BILIRUB SERPL-MCNC: 1.4 MG/DL (ref 0–1.2)
BILIRUB UR QL STRIP: NEGATIVE
BUN SERPL-MCNC: 11.5 MG/DL (ref 6–20)
BUN/CREAT SERPL: 12.1 (ref 7–25)
CALCIUM SPEC-SCNC: 10 MG/DL (ref 8.6–10.5)
CHLORIDE SERPL-SCNC: 102 MMOL/L (ref 98–107)
CLARITY UR: CLEAR
CO2 SERPL-SCNC: 24 MMOL/L (ref 22–29)
COLOR UR: YELLOW
CREAT SERPL-MCNC: 0.95 MG/DL (ref 0.76–1.27)
DEPRECATED RDW RBC AUTO: 38.2 FL (ref 37–54)
EGFRCR SERPLBLD CKD-EPI 2021: 111.1 ML/MIN/1.73
EOSINOPHIL # BLD AUTO: 0.06 10*3/MM3 (ref 0–0.4)
EOSINOPHIL NFR BLD AUTO: 0.8 % (ref 0.3–6.2)
ERYTHROCYTE [DISTWIDTH] IN BLOOD BY AUTOMATED COUNT: 11.8 % (ref 12.3–15.4)
GLOBULIN UR ELPH-MCNC: 2.7 GM/DL
GLUCOSE SERPL-MCNC: 100 MG/DL (ref 65–99)
GLUCOSE UR STRIP-MCNC: NEGATIVE MG/DL
HCT VFR BLD AUTO: 45.1 % (ref 37.5–51)
HGB BLD-MCNC: 15.3 G/DL (ref 13–17.7)
HGB UR QL STRIP.AUTO: ABNORMAL
IMM GRANULOCYTES # BLD AUTO: 0.01 10*3/MM3 (ref 0–0.05)
IMM GRANULOCYTES NFR BLD AUTO: 0.1 % (ref 0–0.5)
KETONES UR QL STRIP: ABNORMAL
LEUKOCYTE ESTERASE UR QL STRIP.AUTO: NEGATIVE
LYMPHOCYTES # BLD AUTO: 1.6 10*3/MM3 (ref 0.7–3.1)
LYMPHOCYTES NFR BLD AUTO: 20.4 % (ref 19.6–45.3)
MCH RBC QN AUTO: 29.6 PG (ref 26.6–33)
MCHC RBC AUTO-ENTMCNC: 33.9 G/DL (ref 31.5–35.7)
MCV RBC AUTO: 87.2 FL (ref 79–97)
MONOCYTES # BLD AUTO: 0.56 10*3/MM3 (ref 0.1–0.9)
MONOCYTES NFR BLD AUTO: 7.2 % (ref 5–12)
NEUTROPHILS NFR BLD AUTO: 5.56 10*3/MM3 (ref 1.7–7)
NEUTROPHILS NFR BLD AUTO: 71 % (ref 42.7–76)
NITRITE UR QL STRIP: NEGATIVE
PH UR STRIP.AUTO: 7 [PH] (ref 5–8)
PLATELET # BLD AUTO: 222 10*3/MM3 (ref 140–450)
PMV BLD AUTO: 10.2 FL (ref 6–12)
POTASSIUM SERPL-SCNC: 3.8 MMOL/L (ref 3.5–5.2)
PROT SERPL-MCNC: 7.5 G/DL (ref 6–8.5)
PROT UR QL STRIP: NEGATIVE
RBC # BLD AUTO: 5.17 10*6/MM3 (ref 4.14–5.8)
SODIUM SERPL-SCNC: 139 MMOL/L (ref 136–145)
SP GR UR STRIP: 1.01 (ref 1–1.03)
UROBILINOGEN UR QL STRIP: ABNORMAL
WBC NRBC COR # BLD AUTO: 7.83 10*3/MM3 (ref 3.4–10.8)

## 2025-07-24 PROCEDURE — 81003 URINALYSIS AUTO W/O SCOPE: CPT | Performed by: EMERGENCY MEDICINE

## 2025-07-24 PROCEDURE — 99284 EMERGENCY DEPT VISIT MOD MDM: CPT | Performed by: EMERGENCY MEDICINE

## 2025-07-24 PROCEDURE — 80053 COMPREHEN METABOLIC PANEL: CPT | Performed by: EMERGENCY MEDICINE

## 2025-07-24 PROCEDURE — 76870 US EXAM SCROTUM: CPT

## 2025-07-24 PROCEDURE — 93976 VASCULAR STUDY: CPT

## 2025-07-24 PROCEDURE — 85025 COMPLETE CBC W/AUTO DIFF WBC: CPT | Performed by: EMERGENCY MEDICINE

## 2025-07-24 PROCEDURE — 99284 EMERGENCY DEPT VISIT MOD MDM: CPT

## 2025-07-24 RX ORDER — IBUPROFEN 400 MG/1
800 TABLET, FILM COATED ORAL ONCE
Status: COMPLETED | OUTPATIENT
Start: 2025-07-24 | End: 2025-07-24

## 2025-07-24 RX ORDER — IBUPROFEN 800 MG/1
800 TABLET, FILM COATED ORAL EVERY 8 HOURS PRN
Qty: 12 TABLET | Refills: 0 | Status: SHIPPED | OUTPATIENT
Start: 2025-07-24

## 2025-07-24 RX ADMIN — IBUPROFEN 800 MG: 400 TABLET, FILM COATED ORAL at 22:37

## 2025-07-25 NOTE — DISCHARGE INSTRUCTIONS
Wear scrotal support  Return ED fever, abdominal pain, vomiting, rash, scrotal swelling or pain, worse condition, any other concerns

## 2025-07-25 NOTE — [facility] PROVIDER NOTE
Subjective   History of Present Illness  29yo male presents ED c/o 4d hx intermittent bilateral alternating testicular discomfort.  ROS neg fever/chills/abd pain/dysuria/penile discharge/rash/trauma.  Pt states discomfort occasionally radiates lower abdomen.    History provided by:  Patient  Testicle Pain      Review of Systems   Constitutional: Negative.    HENT: Negative.     Eyes: Negative.    Respiratory: Negative.     Cardiovascular: Negative.    Gastrointestinal: Negative.    Genitourinary:  Positive for testicular pain. Negative for dysuria, penile discharge, penile pain and penile swelling.   Musculoskeletal: Negative.    Allergic/Immunologic: Negative for immunocompromised state.   All other systems reviewed and are negative.      Past Medical History:   Diagnosis Date    Chlamydia 03/13/2019    Chronic seasonal allergic rhinitis 10/10/2019    Dysuria 02/2019    History of rectal bleeding 10/10/2019    Infected sebaceous cyst 05/2019    RIGHT AXILLA    Laceration of left index finger 01/02/2019    SEEN AT [facility]    Periorbital cellulitis of right eye 1/30/2020    Strep pharyngitis 03/2020    Thoracic myofascial strain 10/2019       Allergies   Allergen Reactions    Amoxicillin Rash       Past Surgical History:   Procedure Laterality Date    WISDOM TOOTH EXTRACTION Bilateral        History reviewed. No pertinent family history.    Social History     Socioeconomic History    Marital status: Single   Tobacco Use    Smoking status: Former     Types: Cigarettes    Smokeless tobacco: Never   Vaping Use    Vaping status: Never Used   Substance and Sexual Activity    Alcohol use: Yes     Alcohol/week: 24.0 standard drinks of alcohol     Types: 24 Cans of beer per week    Drug use: No    Sexual activity: Defer     Partners: Female           Objective   Physical Exam  Vitals and nursing note reviewed. Exam conducted with a chaperone present.   Constitutional:       Appearance: Normal appearance. He is not  toxic-appearing or diaphoretic.   HENT:      Head: Normocephalic and atraumatic.      Right Ear: External ear normal.      Left Ear: External ear normal.      Nose: Nose normal.      Mouth/Throat:      Mouth: Mucous membranes are moist.      Pharynx: Oropharynx is clear.   Eyes:      Pupils: Pupils are equal, round, and reactive to light.   Cardiovascular:      Rate and Rhythm: Normal rate and regular rhythm.      Pulses: Normal pulses.      Heart sounds: Normal heart sounds. No murmur heard.     No friction rub. No gallop.   Pulmonary:      Effort: Pulmonary effort is normal. No respiratory distress.      Breath sounds: Normal breath sounds. No wheezing, rhonchi or rales.   Abdominal:      General: Abdomen is flat. Bowel sounds are normal. There is no distension.      Palpations: Abdomen is soft. There is no mass.      Tenderness: There is no abdominal tenderness. There is no guarding or rebound.      Hernia: No hernia is present. There is no hernia in the left inguinal area or right inguinal area.   Genitourinary:     Testes:         Right: Mass, tenderness or varicocele not present.         Left: Tenderness present. Mass or varicocele not present.      Epididymis:      Right: Normal.      Left: Tenderness present.       Musculoskeletal:         General: No swelling or deformity. Normal range of motion.      Cervical back: Normal range of motion and neck supple. No rigidity.   Lymphadenopathy:      Cervical: No cervical adenopathy.      Lower Body: No right inguinal adenopathy. No left inguinal adenopathy.   Skin:     General: Skin is warm and dry.   Neurological:      General: No focal deficit present.      Mental Status: He is alert and oriented to person, place, and time.      GCS: GCS eye subscore is 4. GCS verbal subscore is 5. GCS motor subscore is 6.         Procedures           ED Course      Labs Reviewed   URINALYSIS WITHOUT MICROSCOPIC (NO CULTURE) - Abnormal; Notable for the following components:        Result Value    Ketones, UA Trace (*)     Blood, UA Trace (*)     All other components within normal limits   COMPREHENSIVE METABOLIC PANEL - Abnormal; Notable for the following components:    Glucose 100 (*)     Total Bilirubin 1.4 (*)     All other components within normal limits    Narrative:     GFR Categories in Chronic Kidney Disease (CKD)              GFR Category          GFR (mL/min/1.73)    Interpretation  G1                    90 or greater        Normal or high (1)  G2                    60-89                Mild decrease (1)  G3a                   45-59                Mild to moderate decrease  G3b                   30-44                Moderate to severe decrease  G4                    15-29                Severe decrease  G5                    14 or less           Kidney failure    (1)In the absence of evidence of kidney disease, neither GFR category G1 or G2 fulfill the criteria for CKD.    eGFR calculation 2021 CKD-EPI creatinine equation, which does not include race as a factor   CBC WITH AUTO DIFFERENTIAL - Abnormal; Notable for the following components:    RDW 11.8 (*)     All other components within normal limits   CBC AND DIFFERENTIAL    Narrative:     The following orders were created for panel order CBC & Differential.  Procedure                               Abnormality         Status                     ---------                               -----------         ------                     CBC Auto Differential[[id number]]        Abnormal            Final result                 Please view results for these tests on the individual orders.     US Testicular or Ovarian Vascular Limited  US Testicular or Ovarian Vascular Limited, US Scrotum & Testicles  Result Date: 7/24/2025  Narrative: SCROTAL DOPPLER  HISTORY: Testicular pain  COMPARISON: None available.  TECHNIQUE: Grayscale, color Doppler, and spectral Doppler waveform analysis was performed of the scrotum.  FINDINGS: Right testicle  measures 4.9 x 3.6 x 2.5 cm. Left testicle measures 5.0 3.3 x 2.3 cm. Normal color Doppler flow is noted within the testicles. They are normal on grayscale images, other than a punctate calcification within the right testicle. Right epididymal head measures 1.2 x 1.2 cm. Left epididymal head measures 0.9 x 1.5 cm. Vascularity appears normal. The patient does have epididymal head cysts on the right.      Impression: No evidence of testicular torsion.  This report was finalized on 7/24/2025 10:18 PM by Dr. Monse Ramirez M.D on Workstation: [email or web address]                                           Medical Decision Making  Labs/sonographic findings reviewed.  CBC/CMP: unremarkable. Mild hyperbilirubinemia.  UA: trace ketones/trace blood.  Testicular US: negative acute finding; incidental right epididymal head cyst. Stable dc with urology followup. Return precautions provided. Recommend scrotal support.  Motrin 800mg tid prn as directed.    Problems Addressed:  Pain in both testicles: complicated acute illness or injury    Amount and/or Complexity of Data Reviewed  Labs: ordered.  Radiology: ordered.    Risk  Prescription drug management.        Final diagnoses:   Pain in both testicles       ED Disposition  ED Disposition       ED Disposition   Discharge    Condition   Good    Comment   --               [facility]  [address]  [place] [address]  [phone]  In 3 days           Medication List        New Prescriptions      ibuprofen 800 MG tablet  Commonly known as: ADVIL,MOTRIN  Take 1 tablet by mouth Every 8 (Eight) Hours As Needed for Mild Pain.               Where to Get Your Medications        These medications were sent to [facility] [id number] - [place], [place] - [address] [facility] - [phone]  - [phone]   [address] [facility], [facility] [address]      Phone: [phone]   ibuprofen 800 MG tablet